# Patient Record
Sex: MALE | Race: WHITE | HISPANIC OR LATINO | Employment: FULL TIME | URBAN - METROPOLITAN AREA
[De-identification: names, ages, dates, MRNs, and addresses within clinical notes are randomized per-mention and may not be internally consistent; named-entity substitution may affect disease eponyms.]

---

## 2020-04-30 ENCOUNTER — APPOINTMENT (OUTPATIENT)
Dept: RADIOLOGY | Facility: MEDICAL CENTER | Age: 25
End: 2020-04-30
Attending: EMERGENCY MEDICINE
Payer: COMMERCIAL

## 2020-04-30 ENCOUNTER — HOSPITAL ENCOUNTER (EMERGENCY)
Facility: MEDICAL CENTER | Age: 25
End: 2020-04-30
Attending: EMERGENCY MEDICINE
Payer: COMMERCIAL

## 2020-04-30 ENCOUNTER — APPOINTMENT (OUTPATIENT)
Dept: RADIOLOGY | Facility: MEDICAL CENTER | Age: 25
End: 2020-04-30
Attending: ORTHOPAEDIC SURGERY
Payer: COMMERCIAL

## 2020-04-30 ENCOUNTER — NON-PROVIDER VISIT (OUTPATIENT)
Dept: OCCUPATIONAL MEDICINE | Facility: CLINIC | Age: 25
End: 2020-04-30
Payer: COMMERCIAL

## 2020-04-30 VITALS
SYSTOLIC BLOOD PRESSURE: 118 MMHG | DIASTOLIC BLOOD PRESSURE: 73 MMHG | BODY MASS INDEX: 25.07 KG/M2 | TEMPERATURE: 98.1 F | OXYGEN SATURATION: 97 % | HEIGHT: 64 IN | HEART RATE: 74 BPM | WEIGHT: 146.83 LBS | RESPIRATION RATE: 18 BRPM

## 2020-04-30 DIAGNOSIS — Z02.83 ENCOUNTER FOR DRUG SCREENING: ICD-10-CM

## 2020-04-30 DIAGNOSIS — S69.92XA INJURY OF HAND BY NAIL GUN, LEFT, INITIAL ENCOUNTER: ICD-10-CM
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
## 2020-04-30 LAB
BREATH ALCOHOL COMMENT: NORMAL
POC BREATHALIZER: 0 PERCENT (ref 0–0.01)

## 2020-04-30 PROCEDURE — 700105 HCHG RX REV CODE 258: Performed by: EMERGENCY MEDICINE

## 2020-04-30 PROCEDURE — 99284 EMERGENCY DEPT VISIT MOD MDM: CPT

## 2020-04-30 PROCEDURE — 700101 HCHG RX REV CODE 250

## 2020-04-30 PROCEDURE — 700111 HCHG RX REV CODE 636 W/ 250 OVERRIDE (IP): Performed by: ORTHOPAEDIC SURGERY

## 2020-04-30 PROCEDURE — 82075 ASSAY OF BREATH ETHANOL: CPT | Performed by: PHYSICIAN ASSISTANT

## 2020-04-30 PROCEDURE — 36415 COLL VENOUS BLD VENIPUNCTURE: CPT | Performed by: PHYSICIAN ASSISTANT

## 2020-04-30 PROCEDURE — 80305 DRUG TEST PRSMV DIR OPT OBS: CPT | Mod: 29 | Performed by: PHYSICIAN ASSISTANT

## 2020-04-30 PROCEDURE — 90715 TDAP VACCINE 7 YRS/> IM: CPT | Performed by: ORTHOPAEDIC SURGERY

## 2020-04-30 PROCEDURE — 90471 IMMUNIZATION ADMIN: CPT

## 2020-04-30 PROCEDURE — 99000 SPECIMEN HANDLING OFFICE-LAB: CPT | Performed by: PHYSICIAN ASSISTANT

## 2020-04-30 PROCEDURE — 700111 HCHG RX REV CODE 636 W/ 250 OVERRIDE (IP): Performed by: EMERGENCY MEDICINE

## 2020-04-30 PROCEDURE — 303485 HCHG DRESSING MEDIUM

## 2020-04-30 PROCEDURE — 73120 X-RAY EXAM OF HAND: CPT | Mod: LT

## 2020-04-30 PROCEDURE — 73130 X-RAY EXAM OF HAND: CPT | Mod: LT

## 2020-04-30 PROCEDURE — 96365 THER/PROPH/DIAG IV INF INIT: CPT

## 2020-04-30 RX ORDER — CEPHALEXIN 500 MG/1
500 CAPSULE ORAL 4 TIMES DAILY
Qty: 28 CAP | Refills: 0 | Status: SHIPPED | OUTPATIENT
Start: 2020-04-30 | End: 2020-05-07

## 2020-04-30 RX ORDER — CEPHALEXIN 250 MG/1
500 CAPSULE ORAL ONCE
Status: DISCONTINUED | OUTPATIENT
Start: 2020-04-30 | End: 2020-04-30 | Stop reason: HOSPADM

## 2020-04-30 RX ORDER — LIDOCAINE HYDROCHLORIDE AND EPINEPHRINE 10; 10 MG/ML; UG/ML
INJECTION, SOLUTION INFILTRATION; PERINEURAL
Status: COMPLETED
Start: 2020-04-30 | End: 2020-04-30

## 2020-04-30 RX ADMIN — CLOSTRIDIUM TETANI TOXOID ANTIGEN (FORMALDEHYDE INACTIVATED), CORYNEBACTERIUM DIPHTHERIAE TOXOID ANTIGEN (FORMALDEHYDE INACTIVATED), BORDETELLA PERTUSSIS TOXOID ANTIGEN (GLUTARALDEHYDE INACTIVATED), BORDETELLA PERTUSSIS FILAMENTOUS HEMAGGLUTININ ANTIGEN (FORMALDEHYDE INACTIVATED), BORDETELLA PERTUSSIS PERTACTIN ANTIGEN, AND BORDETELLA PERTUSSIS FIMBRIAE 2/3 ANTIGEN 0.5 ML: 5; 2; 2.5; 5; 3; 5 INJECTION, SUSPENSION INTRAMUSCULAR at 17:11

## 2020-04-30 RX ADMIN — LIDOCAINE HYDROCHLORIDE AND EPINEPHRINE: 10; 10 INJECTION, SOLUTION INFILTRATION; PERINEURAL at 16:15

## 2020-04-30 RX ADMIN — CEFAZOLIN 1 G: 1 INJECTION, POWDER, FOR SOLUTION INTRAVENOUS at 15:17

## 2020-04-30 SDOH — HEALTH STABILITY: MENTAL HEALTH: HOW OFTEN DO YOU HAVE A DRINK CONTAINING ALCOHOL?: NEVER

## 2020-04-30 NOTE — LETTER
"  FORM C-4:  EMPLOYEE’S CLAIM FOR COMPENSATION/ REPORT OF INITIAL TREATMENT  EMPLOYEE’S CLAIM - PROVIDE ALL INFORMATION REQUESTED   First Name Ulysses Last Name Beltran Birthdate 1995  Sex male Claim Number   Home Employee Address [address]  [facility]                                     Zip  [address] Height  1.626 m (5' 4\") Weight  66.6 kg (146 lb 13.2 oz) [facility]     Mailing Employee Address [address]   [facility]               Zip  [address] Telephone  [phone] (home)  Primary Language Spoken  English   Insurer  [employer]   [employer] Employee's Occupation (Job Title) When Injury or Occupational Disease Occurred     Employer's Name [employer] Telephone [phone]    Employer Address [address]  [facility] [[address]] Zip [address]   Date of Injury  4/30/2020       Hour of Injury  2:00 PM Date Employer Notified  4/30/2020 Last Day of Work after Injury or Occupational Disease  4/30/2020 Supervisor to Whom Injury Reported  Fidel   Address or Location of Accident (if applicable) [Worksite]   What were you doing at the time of accident? (if applicable) Putting in some pieces of wood    How did this injury or occupational disease occur? Be specific and answer in detail. Use additional sheet if necessary)  I grabbed the piece of wood and I didn't realize what I had in my other hand and I shot the nail gun.   If you believe that you have an occupational disease, when did you first have knowledge of the disability and it relationship to your employment? na Witnesses to the Accident  [place]   Nature of Injury or Occupational Disease  Puncture Part(s) of Body Injured or Affected  Hand (L), N/A, N/A    I CERTIFY THAT THE ABOVE IS TRUE AND CORRECT TO THE BEST OF MY KNOWLEDGE AND THAT I HAVE PROVIDED THIS INFORMATION IN ORDER TO OBTAIN THE BENEFITS OF [place]CC[place] " [place]).  I HEREBY AUTHORIZE ANY PHYSICIAN, CHIROPRACTOR, SURGEON, PRACTITIONER, OR OTHER PERSON, ANY HOSPITAL, INCLUDING [facility], ANY MEDICAL SERVICE ORGANIZATION, ANY INSURANCE COMPANY, OR OTHER INSTITUTION OR ORGANIZATION TO RELEASE TO EACH OTHER, ANY MEDICAL OR OTHER INFORMATION, INCLUDING BENEFITS PAID OR PAYABLE, PERTINENT TO THIS INJURY OR DISEASE, EXCEPT INFORMATION RELATIVE TO DIAGNOSIS, TREATMENT AND/OR COUNSELING FOR AIDS, PSYCHOLOGICAL CONDITIONS, ALCOHOL OR CONTROLLED SUBSTANCES, FOR WHICH I MUST GIVE SPECIFIC AUTHORIZATION.  A PHOTOSTAT OF THIS AUTHORIZATION SHALL BE AS VALID AS THE ORIGINAL.  Date  04/30/2020             [facility]                   Employee’s Signature   THIS REPORT MUST BE COMPLETED AND MAILED WITHIN 3 WORKING DAYS OF TREATMENT   [facility], EMERGENCY DEPT                                                                             Name of Facility [facility]   Date  4/30/2020 Diagnosis  (S69.92XA,  W29.4XXA) Injury of hand by nail gun, left, initial encounter Is there evidence the injured employee was under the influence of alcohol and/or another controlled substance at the time of accident?   Hour  6:22 PM Description of Injury or Disease  Injury of hand by nail gun, left, initial encounter No   Treatment  Removal of nail and antibiotics  Have you advised the patient to remain off work five days or more?         No   X-Ray Findings  Positive If Yes   From Date    To Date      From information given by the employee, together with medical evidence, can you directly connect this injury or occupational disease as job incurred? Yes If No, is employee capable of: Full Duty  No Modified Duty  Yes   Is additional medical care by a physician indicated? Yes If Modified Duty, Specify any Limitations / Restrictions    "  Limited use of left hand for next 5-7 days   Do you know of any previous injury or disease contributing to this condition or occupational disease? No    Date 4/30/2020 Print Doctor’s Name Nancy Harpal Cedillo DADA certify the employer’s copy of this form was mailed on:   Address [address] [address] [phone] INSURER’S USE ONLY   Provider’s Tax ID Number [id number] Telephone Dept: [phone]    Doctor’s Signature tammi-MARYLIN Philippe M.D., MD      Form C-4 (rev.10/07)                                                                         BRIEF DESCRIPTION OF RIGHTS AND BENEFITS  (Pursuant to [place] 616C.050)    Notice of Injury or Occupational Disease (Incident Report Form C-1): If an injury or occupational disease (OD) arises out of and in the course of employment, you must provide written notice to your employer as soon as practicable, but no later than 7 days after the accident or OD. Your employer shall maintain a sufficient supply of the required forms.    Claim for Compensation (Form C-4): If medical treatment is sought, the form C-4 is available at the place of initial treatment. A completed \"Claim for Compensation\" (Form C-4) must be filed within 90 days after an accident or OD. The treating physician or chiropractor must, within 3 working days after treatment, complete and mail to the employer, the employer's insurer and third-party , the Claim for Compensation.    Medical Treatment: If you require medical treatment for your on-the-job injury or OD, you may be required to select a physician or chiropractor from a list provided by your workers’ compensation insurer, if it has contracted with an Organization for Managed Care (MCO) or Preferred Provider Organization (PPO) or providers of health care. If your employer has not entered into a contract with an MCO or PPO, you may select a physician or chiropractor from the Panel of Physicians and Chiropractors. Any medical costs " related to your industrial injury or OD will be paid by your insurer.    Temporary Total Disability (TTD): If your doctor has certified that you are unable to work for a period of at least 5 consecutive days, or 5 cumulative days in a 20-day period, or places restrictions on you that your employer does not accommodate, you may be entitled to TTD compensation.    Temporary Partial Disability (TPD): If the wage you receive upon reemployment is less than the compensation for TTD to which you are entitled, the insurer may be required to pay you TPD compensation to make up the difference. TPD can only be paid for a maximum of 24 months.    Permanent Partial Disability (PPD): When your medical condition is stable and there is an indication of a PPD as a result of your injury or OD, within 30 days, your insurer must arrange for an evaluation by a rating physician or chiropractor to determine the degree of your PPD. The amount of your PPD award depends on the date of injury, the results of the PPD evaluation and your age and wage.    Permanent Total Disability (PTD): If you are medically certified by a treating physician or chiropractor as permanently and totally disabled and have been granted a PTD status by your insurer, you are entitled to receive monthly benefits not to exceed 66 2/3% of your average monthly wage. The amount of your PTD payments is subject to reduction if you previously received a PPD award.    Vocational Rehabilitation Services: You may be eligible for vocational rehabilitation services if you are unable to return to the job due to a permanent physical impairment or permanent restrictions as a result of your injury or occupational disease.    Transportation and Per Brandon Reimbursement: You may be eligible for travel expenses and per brandon associated with medical treatment.    Reopening: You may be able to reopen your claim if your condition worsens after claim closure.     Appeal Process: If you disagree  with a written determination issued by the insurer or the insurer does not respond to your request, you may appeal to the Department of Administration, , by following the instructions contained in your determination letter. You must appeal the determination within 70 days from the date of the determination letter at [address], [address], [place], [place] [address], or [address], [address], [place], [place] [address]. If you disagree with the  decision, you may appeal to the Department of Administration, . You must file your appeal within 30 days from the date of the  decision letter at [address], [address], [place], [place] [address], or [address], [address], [place], [place] [address]. If you disagree with a decision of an , you may file a petition for judicial review with the District Court. You must do so within 30 days of the Appeal Officer’s decision. You may be represented by an  at your own expense or you may contact the [facility] for possible representation.    [place]  for Injured Workers (NAIW): If you disagree with a  decision, you may request that NAIW represent you without charge at an  Hearing. For information regarding denial of benefits, you may contact the [facility] at: [address], [address], [place] [address], [phone], or [address], [address], [place] [address], [phone]    To File a Complaint with the Division: If you wish to file a complaint with the  of the Division of Industrial Relations (DIR),  please contact the Workers’ Compensation Section, [address], [address], [place], [place] [address], telephone [phone], or [address], [address], [place], [place] [address], telephone [phone].    For assistance with Workers’ Compensation Issues: You may contact the  Office of the Governor Consumer Health Assistance, [address] [place] [address], [address], [place] [address], Toll Free 1-925.710.9282, Web site: [email or web address]., E-mail [email or web address].  D-2 (rev. 06/18)              __________________________________________________________________                                    ___04/30/2020_____            Employee Name / Signature                                                                                                                            Date

## 2020-04-30 NOTE — ED TRIAGE NOTES
"Chief Complaint   Patient presents with   • Puncture Wound     Nail gun punctured let hand with nail gun; nail protruding through left hand     /94   Pulse 83   Temp 37.2 °C (99 °F) (Temporal)   Resp 18   Ht 1.626 m (5' 4\")   Wt 66.6 kg (146 lb 13.2 oz)   SpO2 96%   BMI 25.20 kg/m²     Negative COVID screen. Mask in place. CMS is intact. Pt unsure of last tetanus.  "

## 2020-05-01 NOTE — ED NOTES
Workers comp paperwork completed. Pt for d/c. Instructions and prescription reviewed with pt using [employer] #[id number] Bran. Pt denies questions. Aware of need to f/u with [facility] on [address] and ortho. To keep bandage on until seen by [facility], return for worsening s/s, ice and otc meds for pain.

## 2020-05-01 NOTE — CONSULTS
DATE OF SERVICE:  04/30/2020    LOCATION:  Emergency department at [facility].      REASON FOR CONSULTATION:  Nail in his left hand palm.      HISTORY OF PRESENT ILLNESS:  The patient is a very pleasant 24-year-old   right-hand dominant [place]ese speaking male who presented to the hospital today   for evaluation of his left hand.  The patient was trying to the nail wood   together when he accidentally discharged a nail gun into his hand.  He denies   any numbness, tingling, paresthesias.  He denies any other acute issues.  He   is unclear as to whether his tetanus is up-to-date or not and he was given   tetanus in the emergency department.  This happened approximately 2 hours   prior to consultation.  History is taken through a [place]ese speaking    provided by the emergency department telephone interpretation system.      PAST MEDICAL HISTORY:  Reviewed with him.  He is otherwise healthy.      MEDICATIONS:  He takes no medications.      REVIEW OF SYSTEMS:  Otherwise negative as per the HPI.      PHYSICAL EXAMINATION:    VITAL SIGNS:  The patient has a temperature of 37.2, pulse of 83, blood   pressure 130/94, respiratory rate of 18.    GENERAL:  He is in no apparent distress.    HEENT:  Normocephalic, atraumatic.    CARDIOVASCULAR:  Regular rate.    PULMONARY:  He has normal chest expansion.  Nonlabored breathing.    ABDOMEN:  Soft, nontender, nondistended.    NEUROLOGIC:  He is alert and oriented to time, place, and to self.    EXTREMITIES:  His left hand is evaluated.  He has a small nail through the mid   palm in between the long and index finger.  The head is not visible.  The   nail is protruding dorsally.  He has actually full extension and full flexion   of his fingers.  Sensation is intact over the median, radial and ulnar nerve   distribution, particularly is intact over the ulnar border of the index finger   and the radial border of the long finger.  He has good capillary refill    throughout.  There is no significant swelling or erythema around the site.      DIAGNOSTIC DATA:  X-rays reviewed showing a metallic object in between the   index and long finger metacarpal bases.      ASSESSMENT AND PLAN:  A 24-year-old right-hand dominant male with a nail in   his left hand.  After reviewing the patient's physical exam and his x-rays, I   did elect to proceed with the nail removal in the emergency department.  The   hand was thoroughly irrigated with alcohol and Betadine.  A small sterile   field was prepared, 5 mL of 1% lidocaine was injected in the carpal tunnel to   give him a neurovascular block.  The patient received some adequate sedation   as well as 3 mL was injected both volarly and dorsally around the nail itself.    With gentle pressure, the head was able to be exposed, the nail was cleaned   both dorsally and volarly.  With gentle traction, the nail was able to be   removed.  Post-removal with the help of the , the patient still had   good sensation throughout his fingers, particularly over the ulnar border of   the index and the radial border of the long.  FDS and FDP function was intact.    He had full extension and he noted he had no significant pain.  A sterile   dressing was applied.  Post-removal x-rays were ordered and showed no remaining foreign body.  He will get get a tetanus shot as well as order of Keflex to be discharged with and I   will have him follow up with me in 2 weeks for reevaluation and reassessment,   if he has any problems prior to then he will see me back sooner.       ____________________________________     MD MASON Alejo / FABY    DD:  04/30/2020 17:16:35  DT:  04/30/2020 18:40:05    D#:  [id number]  Job#:  [id number]

## 2020-05-01 NOTE — DISCHARGE INSTRUCTIONS
Take the antibiotic as directed.  Follow-up with the surgeon in a week for a recheck.  Any redness, drainage, tingling, swelling fever or concerns return.

## 2020-05-01 NOTE — ED NOTES
Assisting with care-await completion of workers comp paperwork for d/c. Vs as charted. [id number].